# Patient Record
Sex: MALE | Race: WHITE | NOT HISPANIC OR LATINO | Employment: FULL TIME | URBAN - METROPOLITAN AREA
[De-identification: names, ages, dates, MRNs, and addresses within clinical notes are randomized per-mention and may not be internally consistent; named-entity substitution may affect disease eponyms.]

---

## 2019-07-22 ENCOUNTER — TELEPHONE (OUTPATIENT)
Dept: SCHEDULING | Facility: IMAGING CENTER | Age: 39
End: 2019-07-22

## 2019-09-23 ENCOUNTER — OFFICE VISIT (OUTPATIENT)
Dept: MEDICAL GROUP | Facility: MEDICAL CENTER | Age: 39
End: 2019-09-23
Payer: COMMERCIAL

## 2019-09-23 VITALS
WEIGHT: 288.8 LBS | HEIGHT: 75 IN | OXYGEN SATURATION: 95 % | TEMPERATURE: 98.3 F | BODY MASS INDEX: 35.91 KG/M2 | SYSTOLIC BLOOD PRESSURE: 138 MMHG | HEART RATE: 68 BPM | RESPIRATION RATE: 16 BRPM | DIASTOLIC BLOOD PRESSURE: 94 MMHG

## 2019-09-23 DIAGNOSIS — I10 ESSENTIAL HYPERTENSION: ICD-10-CM

## 2019-09-23 DIAGNOSIS — Z76.89 ENCOUNTER TO ESTABLISH CARE: ICD-10-CM

## 2019-09-23 DIAGNOSIS — Z00.00 PREVENTATIVE HEALTH CARE: ICD-10-CM

## 2019-09-23 DIAGNOSIS — E66.9 OBESITY (BMI 35.0-39.9 WITHOUT COMORBIDITY): ICD-10-CM

## 2019-09-23 PROCEDURE — 99204 OFFICE O/P NEW MOD 45 MIN: CPT | Performed by: PHYSICIAN ASSISTANT

## 2019-09-23 RX ORDER — AMLODIPINE BESYLATE 5 MG/1
5 TABLET ORAL DAILY
Qty: 30 TAB | Refills: 3 | Status: SHIPPED | OUTPATIENT
Start: 2019-09-23 | End: 2019-12-23 | Stop reason: SDUPTHER

## 2019-09-23 ASSESSMENT — PATIENT HEALTH QUESTIONNAIRE - PHQ9: CLINICAL INTERPRETATION OF PHQ2 SCORE: 0

## 2019-09-23 NOTE — PROGRESS NOTES
"Subjective:   João Luque is a 39 y.o. male here today for establishing care and hypertension.    Essential hypertension  This is a 39-year-old male who is here today to establish care.  Believes he has a chronic history of elevated blood pressure.  Over the summer it appears his blood pressure was getting worse.  Checks his blood pressure routinely.  Typically 140/90.  This is in the morning.  His mother has hypertension.  He denies any chest pain or shortness of breath.  He is .  3 children.  Had a failed vasectomy because neurology was unable to find his vas deferens.  They are currently using condoms.  He has no other chronic medical conditions.  Does take vitamin D daily.  5000 IUs.  Supplement he believes is very helpful.       Current medicines (including changes today)  Current Outpatient Medications   Medication Sig Dispense Refill   • amLODIPine (NORVASC) 5 MG Tab Take 1 Tab by mouth every day. 30 Tab 3   • Cholecalciferol (VITAMIN D3) 1000 units Cap Take 5,000 Units by mouth every day.       No current facility-administered medications for this visit.      He  has a past medical history of Obesity (1/29/2013).    Social History and Family History were reviewed and updated.    ROS   No chest pain, no shortness of breath, no abdominal pain and all other systems were reviewed and are negative.       Objective:     /94 (BP Location: Left arm, Patient Position: Sitting, BP Cuff Size: Adult)   Pulse 68   Temp 36.8 °C (98.3 °F) (Temporal)   Resp 16   Ht 1.905 m (6' 3\")   Wt (!) 131 kg (288 lb 12.8 oz)   SpO2 95%  Body mass index is 36.1 kg/m².   Physical Exam:  Constitutional: Alert, no distress.  Skin: Warm, dry, good turgor, no rashes in visible areas.  Eye: Equal, round and reactive, conjunctiva clear, lids normal.  ENMT: Lips without lesions, good dentition, oropharynx clear.  Neck: Trachea midline, no masses.   Lymph: No cervical or supraclavicular lymphadenopathy  Respiratory: " Unlabored respiratory effort, lungs clear to auscultation, no wheezes, no ronchi.  Cardiovascular: Normal S1, S2, no murmur, no edema.  Psych: Alert and oriented x3, normal affect and mood.        Assessment and Plan:   The following treatment plan was discussed    1. Essential hypertension  Chronic condition.  Discussed making dietary and lifestyle changes.  Will start on amlodipine 5 mg.  Discussed side effects.  Obtain labs in 2 to 3 weeks.  Follow-up in 3 months.  - amLODIPine (NORVASC) 5 MG Tab; Take 1 Tab by mouth every day.  Dispense: 30 Tab; Refill: 3    2. Obesity (BMI 35.0-39.9 without comorbidity)  Chronic condition.  We will continue to monitor.  - Patient identified as having weight management issue.  Appropriate orders and counseling given.    3. Preventative health care  Ordered labs fasting.  He will be contacted with the results.  - Lipid Profile; Future  - Comp Metabolic Panel; Future  - HEMOGLOBIN A1C; Future  - VITAMIN D,25 HYDROXY; Future    4. Encounter to establish care      Followup: Return in about 3 months (around 12/23/2019), or if symptoms worsen or fail to improve.    Please note that this dictation was created using voice recognition software. I have made every reasonable attempt to correct obvious errors, but I expect that there are errors of grammar and possibly content that I did not discover before finalizing the note.

## 2019-12-13 ENCOUNTER — HOSPITAL ENCOUNTER (OUTPATIENT)
Dept: LAB | Facility: MEDICAL CENTER | Age: 39
End: 2019-12-13
Attending: PHYSICIAN ASSISTANT
Payer: COMMERCIAL

## 2019-12-13 DIAGNOSIS — Z00.00 PREVENTATIVE HEALTH CARE: ICD-10-CM

## 2019-12-13 LAB
ALBUMIN SERPL BCP-MCNC: 4.9 G/DL (ref 3.2–4.9)
ALBUMIN/GLOB SERPL: 1.6 G/DL
ALP SERPL-CCNC: 47 U/L (ref 30–99)
ALT SERPL-CCNC: 37 U/L (ref 2–50)
ANION GAP SERPL CALC-SCNC: 11 MMOL/L (ref 0–11.9)
AST SERPL-CCNC: 28 U/L (ref 12–45)
BILIRUB SERPL-MCNC: 0.5 MG/DL (ref 0.1–1.5)
BUN SERPL-MCNC: 17 MG/DL (ref 8–22)
CALCIUM SERPL-MCNC: 10 MG/DL (ref 8.5–10.5)
CHLORIDE SERPL-SCNC: 105 MMOL/L (ref 96–112)
CHOLEST SERPL-MCNC: 223 MG/DL (ref 100–199)
CO2 SERPL-SCNC: 24 MMOL/L (ref 20–33)
CREAT SERPL-MCNC: 1.22 MG/DL (ref 0.5–1.4)
FASTING STATUS PATIENT QL REPORTED: NORMAL
GLOBULIN SER CALC-MCNC: 3 G/DL (ref 1.9–3.5)
GLUCOSE SERPL-MCNC: 87 MG/DL (ref 65–99)
HDLC SERPL-MCNC: 37 MG/DL
LDLC SERPL CALC-MCNC: 154 MG/DL
POTASSIUM SERPL-SCNC: 4 MMOL/L (ref 3.6–5.5)
PROT SERPL-MCNC: 7.9 G/DL (ref 6–8.2)
SODIUM SERPL-SCNC: 140 MMOL/L (ref 135–145)
TRIGL SERPL-MCNC: 162 MG/DL (ref 0–149)

## 2019-12-13 PROCEDURE — 36415 COLL VENOUS BLD VENIPUNCTURE: CPT

## 2019-12-13 PROCEDURE — 80053 COMPREHEN METABOLIC PANEL: CPT

## 2019-12-13 PROCEDURE — 83036 HEMOGLOBIN GLYCOSYLATED A1C: CPT

## 2019-12-13 PROCEDURE — 80061 LIPID PANEL: CPT

## 2019-12-13 PROCEDURE — 82306 VITAMIN D 25 HYDROXY: CPT

## 2019-12-14 LAB — 25(OH)D3 SERPL-MCNC: 55 NG/ML (ref 30–100)

## 2019-12-17 PROBLEM — E78.2 MIXED HYPERLIPIDEMIA: Status: ACTIVE | Noted: 2019-12-17

## 2019-12-17 LAB
EST. AVERAGE GLUCOSE BLD GHB EST-MCNC: 108 MG/DL
HBA1C MFR BLD: 5.4 % (ref 0–5.6)

## 2019-12-23 ENCOUNTER — OFFICE VISIT (OUTPATIENT)
Dept: MEDICAL GROUP | Facility: MEDICAL CENTER | Age: 39
End: 2019-12-23
Payer: COMMERCIAL

## 2019-12-23 VITALS
OXYGEN SATURATION: 95 % | BODY MASS INDEX: 35.63 KG/M2 | SYSTOLIC BLOOD PRESSURE: 134 MMHG | RESPIRATION RATE: 16 BRPM | HEART RATE: 82 BPM | HEIGHT: 75 IN | DIASTOLIC BLOOD PRESSURE: 88 MMHG | WEIGHT: 286.6 LBS | TEMPERATURE: 98.2 F

## 2019-12-23 DIAGNOSIS — E78.2 MIXED HYPERLIPIDEMIA: ICD-10-CM

## 2019-12-23 DIAGNOSIS — I10 ESSENTIAL HYPERTENSION: ICD-10-CM

## 2019-12-23 DIAGNOSIS — G47.33 OBSTRUCTIVE SLEEP APNEA SYNDROME: ICD-10-CM

## 2019-12-23 PROCEDURE — 99214 OFFICE O/P EST MOD 30 MIN: CPT | Performed by: PHYSICIAN ASSISTANT

## 2019-12-23 RX ORDER — AMLODIPINE BESYLATE 5 MG/1
5 TABLET ORAL DAILY
Qty: 90 TAB | Refills: 1 | Status: SHIPPED | OUTPATIENT
Start: 2019-12-23 | End: 2020-07-06

## 2019-12-23 NOTE — PROGRESS NOTES
"Subjective:   João Luque is a 39 y.o. male here today for hyperlipidemia, hypertension, sleep apnea and lab results review.    Mixed hyperlipidemia  This is a 39-year-old male who is here today to discuss recent labs.  Labs overall were good except his cholesterol profile.  Showed the following:     Ref. Range 12/13/2019 07:40   Cholesterol,Tot Latest Ref Range: 100 - 199 mg/dL 223 (H)   Triglycerides Latest Ref Range: 0 - 149 mg/dL 162 (H)   HDL Latest Ref Range: >=40 mg/dL 37 (A)   LDL Latest Ref Range: <100 mg/dL 154 (H)     His last cholesterol drawn in 2013 or so his total cholesterol was less than 200.  .  He states that he would like to be given the opportunity to eat healthier and exercise routinely.  He knows that he can make some changes.  In the past he is done well with exercising.    Essential hypertension  Chronic condition.  Currently on 5 mg of amlodipine.  Denies any side effects taking the medication.    Obstructive sleep apnea syndrome  States in 2013 he had a sleep study that showed sleep apnea.  Had witnessed apneic events.  Currently using CPAP.  Wears it nightly.  No concerns.       Current medicines (including changes today)  Current Outpatient Medications   Medication Sig Dispense Refill   • amLODIPine (NORVASC) 5 MG Tab Take 1 Tab by mouth every day. 90 Tab 1   • Cholecalciferol (VITAMIN D3) 1000 units Cap Take 5,000 Units by mouth every day.       No current facility-administered medications for this visit.      He  has a past medical history of Obesity (1/29/2013).    Social History and Family History were reviewed and updated.    ROS   No chest pain, no shortness of breath, no abdominal pain and all other systems were reviewed and are negative.       Objective:     /88 (BP Location: Left arm, Patient Position: Sitting, BP Cuff Size: Adult)   Pulse 82   Temp 36.8 °C (98.2 °F) (Temporal)   Resp 16   Ht 1.905 m (6' 3\")   Wt (!) 130 kg (286 lb 9.6 oz)   SpO2 95%  " Body mass index is 35.82 kg/m².   Physical Exam:  Constitutional: Alert, no distress.  Skin: Warm, dry, good turgor, no rashes in visible areas.  Eye: Equal, round and reactive, conjunctiva clear, lids normal.  ENMT: Lips without lesions, good dentition, oropharynx clear.  Neck: Trachea midline, no masses.   Lymph: No cervical or supraclavicular lymphadenopathy  Respiratory: Unlabored respiratory effort, lungs appear clear, no wheezes.  Cardiovascular: Regular rate and rhythm.  Psych: Alert and oriented x3, normal affect and mood.        Assessment and Plan:   The following treatment plan was discussed    1. Mixed hyperlipidemia  New condition noted in chart.  Discussed concern regarding hypertension and now hyperlipidemia.  States that he will begin an exercise program as well as eat healthier.  Discussed fatty food intake concern.  Will check cholesterol profile in 1 to 3 months.  If not improved he will be started on a statin.  - Lipid Profile; Future    2. Obstructive sleep apnea syndrome  New condition noted in chart but chronic.  Continue CPAP use.    3. Essential hypertension  Chronic condition.  Stable.  Given his potential of changing with lifestyle changes will hold off on increasing amlodipine and continue at 5 mg daily.  Renewed medication.  - amLODIPine (NORVASC) 5 MG Tab; Take 1 Tab by mouth every day.  Dispense: 90 Tab; Refill: 1      Followup: Return in about 6 months (around 6/23/2020), or if symptoms worsen or fail to improve.    Please note that this dictation was created using voice recognition software. I have made every reasonable attempt to correct obvious errors, but I expect that there are errors of grammar and possibly content that I did not discover before finalizing the note.

## 2019-12-23 NOTE — ASSESSMENT & PLAN NOTE
States in 2013 he had a sleep study that showed sleep apnea.  Had witnessed apneic events.  Currently using CPAP.  Wears it nightly.  No concerns.

## 2019-12-23 NOTE — ASSESSMENT & PLAN NOTE
This is a 39-year-old male who is here today to discuss recent labs.  Labs overall were good except his cholesterol profile.  Showed the following:     Ref. Range 12/13/2019 07:40   Cholesterol,Tot Latest Ref Range: 100 - 199 mg/dL 223 (H)   Triglycerides Latest Ref Range: 0 - 149 mg/dL 162 (H)   HDL Latest Ref Range: >=40 mg/dL 37 (A)   LDL Latest Ref Range: <100 mg/dL 154 (H)     His last cholesterol drawn in 2013 or so his total cholesterol was less than 200.  .  He states that he would like to be given the opportunity to eat healthier and exercise routinely.  He knows that he can make some changes.  In the past he is done well with exercising.

## 2019-12-23 NOTE — ASSESSMENT & PLAN NOTE
Chronic condition.  Currently on 5 mg of amlodipine.  Denies any side effects taking the medication.

## 2020-06-16 ENCOUNTER — APPOINTMENT (OUTPATIENT)
Dept: MEDICAL GROUP | Facility: MEDICAL CENTER | Age: 40
End: 2020-06-16
Payer: COMMERCIAL

## 2020-06-24 ENCOUNTER — HOSPITAL ENCOUNTER (OUTPATIENT)
Dept: LAB | Facility: MEDICAL CENTER | Age: 40
End: 2020-06-24
Attending: PHYSICIAN ASSISTANT
Payer: COMMERCIAL

## 2020-06-24 DIAGNOSIS — E78.2 MIXED HYPERLIPIDEMIA: ICD-10-CM

## 2020-06-24 LAB
CHOLEST SERPL-MCNC: 221 MG/DL (ref 100–199)
FASTING STATUS PATIENT QL REPORTED: NORMAL
HDLC SERPL-MCNC: 41 MG/DL
LDLC SERPL CALC-MCNC: 146 MG/DL
TRIGL SERPL-MCNC: 170 MG/DL (ref 0–149)

## 2020-06-24 PROCEDURE — 80061 LIPID PANEL: CPT

## 2020-06-24 PROCEDURE — 36415 COLL VENOUS BLD VENIPUNCTURE: CPT

## 2020-06-26 ENCOUNTER — APPOINTMENT (OUTPATIENT)
Dept: MEDICAL GROUP | Facility: MEDICAL CENTER | Age: 40
End: 2020-06-26
Payer: COMMERCIAL

## 2020-06-30 ENCOUNTER — OFFICE VISIT (OUTPATIENT)
Dept: MEDICAL GROUP | Facility: MEDICAL CENTER | Age: 40
End: 2020-06-30
Payer: COMMERCIAL

## 2020-06-30 VITALS
RESPIRATION RATE: 16 BRPM | BODY MASS INDEX: 33.17 KG/M2 | HEIGHT: 75 IN | DIASTOLIC BLOOD PRESSURE: 86 MMHG | TEMPERATURE: 98.4 F | WEIGHT: 266.76 LBS | HEART RATE: 68 BPM | SYSTOLIC BLOOD PRESSURE: 132 MMHG | OXYGEN SATURATION: 94 %

## 2020-06-30 DIAGNOSIS — I10 ESSENTIAL HYPERTENSION: ICD-10-CM

## 2020-06-30 DIAGNOSIS — E78.2 MIXED HYPERLIPIDEMIA: ICD-10-CM

## 2020-06-30 PROCEDURE — 99214 OFFICE O/P EST MOD 30 MIN: CPT | Performed by: PHYSICIAN ASSISTANT

## 2020-06-30 RX ORDER — ATORVASTATIN CALCIUM 20 MG/1
20 TABLET, FILM COATED ORAL DAILY
Qty: 30 TAB | Refills: 2 | Status: SHIPPED | OUTPATIENT
Start: 2020-06-30 | End: 2020-10-12

## 2020-06-30 ASSESSMENT — PATIENT HEALTH QUESTIONNAIRE - PHQ9: CLINICAL INTERPRETATION OF PHQ2 SCORE: 0

## 2020-06-30 NOTE — PROGRESS NOTES
"Subjective:   João Luque is a 40 y.o. male here today for hyperlipidemia and hypertension.    Mixed hyperlipidemia  This is a 40-year-old male who following up today regarding his cholesterol profile.  LDL dropped 10 points.  Still in the 140s.  He changed his meat from red to white.  Disappointed in the small change.  Family history of heart disease with his dad.  Would like to start medication.    Essential hypertension  Chronic condition.  Amlodipine 5 mg has been effective.  Blood pressure currently runs in the 120s over 70s at home.  No side effects.       Current medicines (including changes today)  Current Outpatient Medications   Medication Sig Dispense Refill   • atorvastatin (LIPITOR) 20 MG Tab Take 1 Tab by mouth every day. 30 Tab 2   • amLODIPine (NORVASC) 5 MG Tab Take 1 Tab by mouth every day. 90 Tab 1   • Cholecalciferol (VITAMIN D3) 1000 units Cap Take 5,000 Units by mouth every day.       No current facility-administered medications for this visit.      He  has a past medical history of Obesity (1/29/2013).    Social History and Family History were reviewed and updated.    ROS   No chest pain, no shortness of breath, no abdominal pain and all other systems were reviewed and are negative.       Objective:     /86   Pulse 68   Temp 36.9 °C (98.4 °F) (Temporal)   Resp 16   Ht 1.905 m (6' 3\")   Wt 121 kg (266 lb 12.1 oz)   SpO2 94%  Body mass index is 33.34 kg/m².   Physical Exam:  Constitutional: Alert, no distress.  Skin: Warm, dry, good turgor, no rashes in visible areas.  Eye: Equal, round and reactive, conjunctiva clear, lids normal.  ENMT: Lips without lesions, good dentition, oropharynx clear.  Neck: Trachea midline, no masses.   Lymph: No cervical or supraclavicular lymphadenopathy  Respiratory: Unlabored respiratory effort, lungs appear clear, no wheezes.  Cardiovascular: Regular rate and rhythm.  Psych: Alert and oriented x3, normal affect and mood.        Assessment and " Plan:   The following treatment plan was discussed    1. Mixed hyperlipidemia  Chronic condition.  Uncontrolled.  Will start Lipitor.  Discussed side effects.  Repeat cholesterol profile and hepatic function in 1 month.  - atorvastatin (LIPITOR) 20 MG Tab; Take 1 Tab by mouth every day.  Dispense: 30 Tab; Refill: 2  - HEPATIC FUNCTION PANEL; Future  - Lipid Profile; Future    2. Essential hypertension  Chronic condition.  Stable.  Continue amlodipine as directed.  Continue to develop lifestyle modifications.  Exercise routinely if possible.      Followup: Return in about 6 months (around 12/30/2020), or if symptoms worsen or fail to improve.    Please note that this dictation was created using voice recognition software. I have made every reasonable attempt to correct obvious errors, but I expect that there are errors of grammar and possibly content that I did not discover before finalizing the note.

## 2020-06-30 NOTE — ASSESSMENT & PLAN NOTE
Chronic condition.  Amlodipine 5 mg has been effective.  Blood pressure currently runs in the 120s over 70s at home.  No side effects.

## 2020-06-30 NOTE — ASSESSMENT & PLAN NOTE
This is a 40-year-old male who following up today regarding his cholesterol profile.  LDL dropped 10 points.  Still in the 140s.  He changed his meat from red to white.  Disappointed in the small change.  Family history of heart disease with his dad.  Would like to start medication.

## 2020-11-20 ENCOUNTER — HOSPITAL ENCOUNTER (OUTPATIENT)
Dept: LAB | Facility: MEDICAL CENTER | Age: 40
End: 2020-11-20
Payer: COMMERCIAL

## 2020-11-22 LAB
COVID ORDER STATUS COVID19: NORMAL
SARS-COV-2 RNA RESP QL NAA+PROBE: DETECTED
SPECIMEN SOURCE: ABNORMAL

## 2020-12-11 ENCOUNTER — HOSPITAL ENCOUNTER (OUTPATIENT)
Dept: LAB | Facility: MEDICAL CENTER | Age: 40
End: 2020-12-11
Attending: PHYSICIAN ASSISTANT
Payer: COMMERCIAL

## 2020-12-11 DIAGNOSIS — E78.2 MIXED HYPERLIPIDEMIA: ICD-10-CM

## 2020-12-11 LAB
ALBUMIN SERPL BCP-MCNC: 4.6 G/DL (ref 3.2–4.9)
ALP SERPL-CCNC: 55 U/L (ref 30–99)
ALT SERPL-CCNC: 35 U/L (ref 2–50)
AST SERPL-CCNC: 23 U/L (ref 12–45)
BILIRUB CONJ SERPL-MCNC: <0.2 MG/DL (ref 0.1–0.5)
BILIRUB INDIRECT SERPL-MCNC: NORMAL MG/DL (ref 0–1)
BILIRUB SERPL-MCNC: 0.6 MG/DL (ref 0.1–1.5)
CHOLEST SERPL-MCNC: 161 MG/DL (ref 100–199)
FASTING STATUS PATIENT QL REPORTED: NORMAL
HDLC SERPL-MCNC: 38 MG/DL
LDLC SERPL CALC-MCNC: 96 MG/DL
PROT SERPL-MCNC: 7.3 G/DL (ref 6–8.2)
TRIGL SERPL-MCNC: 135 MG/DL (ref 0–149)

## 2020-12-11 PROCEDURE — 36415 COLL VENOUS BLD VENIPUNCTURE: CPT

## 2020-12-11 PROCEDURE — 80061 LIPID PANEL: CPT

## 2020-12-11 PROCEDURE — 80076 HEPATIC FUNCTION PANEL: CPT

## 2020-12-15 ENCOUNTER — OFFICE VISIT (OUTPATIENT)
Dept: MEDICAL GROUP | Facility: MEDICAL CENTER | Age: 40
End: 2020-12-15
Payer: COMMERCIAL

## 2020-12-15 VITALS
TEMPERATURE: 98 F | DIASTOLIC BLOOD PRESSURE: 78 MMHG | HEART RATE: 83 BPM | OXYGEN SATURATION: 98 % | HEIGHT: 75 IN | BODY MASS INDEX: 36.18 KG/M2 | WEIGHT: 291.01 LBS | SYSTOLIC BLOOD PRESSURE: 128 MMHG

## 2020-12-15 DIAGNOSIS — Z86.16 HISTORY OF 2019 NOVEL CORONAVIRUS DISEASE (COVID-19): ICD-10-CM

## 2020-12-15 DIAGNOSIS — E78.2 MIXED HYPERLIPIDEMIA: ICD-10-CM

## 2020-12-15 DIAGNOSIS — E66.9 OBESITY (BMI 35.0-39.9 WITHOUT COMORBIDITY): ICD-10-CM

## 2020-12-15 DIAGNOSIS — I10 ESSENTIAL HYPERTENSION: ICD-10-CM

## 2020-12-15 DIAGNOSIS — F41.9 ANXIETY: ICD-10-CM

## 2020-12-15 PROCEDURE — 99214 OFFICE O/P EST MOD 30 MIN: CPT | Performed by: PHYSICIAN ASSISTANT

## 2020-12-15 NOTE — PROGRESS NOTES
Subjective:   João Luque is a 40 y.o. male here today for history of Covid, hypertension, hyperlipidemia and anxiety.    History of 2019 novel coronavirus disease (COVID-19)  This is a 40-year-old male here today to follow-up on his health.  Last month contracted Covid.  Wife got it first.  All 3 other children had it.  Thought possibly they contracted it during a Girl  event.  Ivelisse is doing well.  He had some nasal congestion and lost his sense of smell.  Symptoms have resolved.  His loss of smell and taste are returning slowly.    Essential hypertension  Chronic condition.  States his blood pressure has been improved.  In the morning it is reading in the 120s over 70s.  He has gained significant weight since his last appointment and June.  Plans to lose weight again.    Mixed hyperlipidemia  Cholesterol levels did improve on Lipitor 20 mg.  LDL below 100.    Anxiety  Complains of a chronic history of anxiety.  States that he and his wife went to marriage counseling for about a year.  It helped a lot with her marriage and his anxiety stems mostly from work and just as an individual.  He consumes about 3 beers a week.  Denies any known triggers to anxiety.  Denies any depression.  Does have stressors at work.  Would like to see a therapist for counseling.       Current medicines (including changes today)  Current Outpatient Medications   Medication Sig Dispense Refill   • atorvastatin (LIPITOR) 20 MG Tab TAKE ONE TABLET BY MOUTH EVERY DAY 90 Tab 1   • amLODIPine (NORVASC) 5 MG Tab TAKE ONE TABLET BY MOUTH EVERY DAY 90 Tab 1   • Cholecalciferol (VITAMIN D3) 1000 units Cap Take 5,000 Units by mouth every day.       No current facility-administered medications for this visit.      He  has a past medical history of Obesity (1/29/2013).    Social History and Family History were reviewed and updated.    ROS   No chest pain, no shortness of breath, no abdominal pain and all other systems were reviewed and are  "negative.       Objective:     /78 (BP Location: Left arm, Patient Position: Sitting)   Pulse 83   Temp 36.7 °C (98 °F) (Temporal)   Ht 1.905 m (6' 3\")   Wt (!) 132 kg (291 lb 0.1 oz)   SpO2 98%  Body mass index is 36.37 kg/m².   Physical Exam:  Constitutional: Alert, no distress.  Skin: Warm, dry, good turgor, no rashes in visible areas.  Eye: Equal, round and reactive, conjunctiva clear, lids normal.  ENMT: Lips without lesions, good dentition, oropharynx clear.  Neck: Trachea midline, no masses.   Lymph: No cervical or supraclavicular lymphadenopathy  Respiratory: Unlabored respiratory effort, lungs appear clear, no wheezes.  Cardiovascular: Regular rate rhythm.  Psych: Alert and oriented x3, normal affect and mood.        Assessment and Plan:   The following treatment plan was discussed    1. History of 2019 novel coronavirus disease (COVID-19)  Acute, new onset condition.  Resolved.  Stable.    2. Essential hypertension  Chronic condition.  Stable.  We will see how he does in the next 6 months.  Needs to lose weight again.  We will continue on the amlodipine 5 mg.    3. Mixed hyperlipidemia  Chronic condition.  Controlled.  Continue medication as directed.    4. Anxiety  New condition noted in chart but chronic.  Refer to psychology for evaluation and treatment.  - REFERRAL TO BEHAVIORAL HEALTH    5. Obesity (BMI 35.0-39.9 without comorbidity) (HCC)  Chronic condition.  We will continue to monitor.  - Patient identified as having weight management issue.  Appropriate orders and counseling given.      Followup: Return in about 6 months (around 6/15/2021), or if symptoms worsen or fail to improve.    Please note that this dictation was created using voice recognition software. I have made every reasonable attempt to correct obvious errors, but I expect that there are errors of grammar and possibly content that I did not discover before finalizing the note.           "

## 2020-12-15 NOTE — ASSESSMENT & PLAN NOTE
This is a 40-year-old male here today to follow-up on his health.  Last month contracted Covid.  Wife got it first.  All 3 other children had it.  Thought possibly they contracted it during a Girl  event.  Ivelisse is doing well.  He had some nasal congestion and lost his sense of smell.  Symptoms have resolved.  His loss of smell and taste are returning slowly.

## 2020-12-15 NOTE — ASSESSMENT & PLAN NOTE
Chronic condition.  States his blood pressure has been improved.  In the morning it is reading in the 120s over 70s.  He has gained significant weight since his last appointment and June.  Plans to lose weight again.

## 2020-12-15 NOTE — ASSESSMENT & PLAN NOTE
Complains of a chronic history of anxiety.  States that he and his wife went to marriage counseling for about a year.  It helped a lot with her marriage and his anxiety stems mostly from work and just as an individual.  He consumes about 3 beers a week.  Denies any known triggers to anxiety.  Denies any depression.  Does have stressors at work.  Would like to see a therapist for counseling.

## 2021-01-16 DIAGNOSIS — I10 ESSENTIAL HYPERTENSION: ICD-10-CM

## 2021-01-17 RX ORDER — AMLODIPINE BESYLATE 5 MG/1
TABLET ORAL
Qty: 90 TAB | Refills: 1 | Status: SHIPPED | OUTPATIENT
Start: 2021-01-17 | End: 2021-07-16

## 2021-03-31 ENCOUNTER — OFFICE VISIT (OUTPATIENT)
Dept: DERMATOLOGY | Facility: IMAGING CENTER | Age: 41
End: 2021-03-31
Payer: COMMERCIAL

## 2021-03-31 DIAGNOSIS — D22.9 MULTIPLE NEVI: ICD-10-CM

## 2021-03-31 PROCEDURE — 99202 OFFICE O/P NEW SF 15 MIN: CPT | Performed by: NURSE PRACTITIONER

## 2021-03-31 NOTE — PROGRESS NOTES
DERMATOLOGY NOTE  NEW VISIT       Chief complaint: mole of concern    HPI:mole that had some black spots in it, but now the black spots are gone  Location: upper back right shoulder   Time present:  2 weeks   Painful lesion: No  Itching lesion: No  Enlarging lesion: No  Anything make it better or worse?no     HPI: mole at scalp has not changed just would like it looked at   Location:  Back of neck   Time present: birth   Painful lesion: No  Itching lesion: No  Enlarging lesion: No  Anything make it better or worse?          History of skin cancer: No  History of precancers/actinic keratoses: No  History of biopsies:No  History of blistering/severe sunburns:No  Family history of skin cancer:No  Family history of atypical moles:No          No Known Allergies     MEDICATIONS:  Medications relevant to specialty reviewed.       REVIEW OF SYSTEMS:   Positive for skin (see HPI)  Negative for fevers and chills       EXAM:  There were no vitals taken for this visit.  Constitutional: Well-developed, well-nourished, and in no distress.     A focused skin exam was performed including the affected areas of the back and scalp. Notable findings on exam today listed below and/or in assessment/plan.     Multiple tan light brown skin-colored macules papules scattered over the back and neck. No concerning features under dermoscopy    Benign appearing nevus to right side of scalp.       IMPRESSION / PLAN:    1. Multiple nevi  - Benign-appearing nature of lesions discussed. Advised to return to clinic for any new or concerning changes.  - ABCDE's of melanoma handout  -sun protection and handout provided         Return to clinic in: Return for for any skin concern. and as needed for any new or changing skin lesions.

## 2021-05-05 DIAGNOSIS — E78.2 MIXED HYPERLIPIDEMIA: ICD-10-CM

## 2021-05-07 RX ORDER — ATORVASTATIN CALCIUM 20 MG/1
TABLET, FILM COATED ORAL
Qty: 90 TABLET | Refills: 1 | Status: SHIPPED | OUTPATIENT
Start: 2021-05-07 | End: 2021-11-30

## 2021-07-16 DIAGNOSIS — I10 ESSENTIAL HYPERTENSION: ICD-10-CM

## 2021-07-16 RX ORDER — AMLODIPINE BESYLATE 5 MG/1
TABLET ORAL
Qty: 90 TABLET | Refills: 1 | Status: SHIPPED
Start: 2021-07-16 | End: 2022-01-21

## 2021-07-23 NOTE — ASSESSMENT & PLAN NOTE
This is a 39-year-old male who is here today to establish care.  Believes he has a chronic history of elevated blood pressure.  Over the summer it appears his blood pressure was getting worse.  Checks his blood pressure routinely.  Typically 140/90.  This is in the morning.  His mother has hypertension.  He denies any chest pain or shortness of breath.  He is .  3 children.  Had a failed vasectomy because neurology was unable to find his vas deferens.  They are currently using condoms.  He has no other chronic medical conditions.  Does take vitamin D daily.  5000 IUs.  Supplement he believes is very helpful.  
23-Jul-2021

## 2022-01-21 ENCOUNTER — OFFICE VISIT (OUTPATIENT)
Dept: MEDICAL GROUP | Facility: MEDICAL CENTER | Age: 42
End: 2022-01-21
Payer: COMMERCIAL

## 2022-01-21 VITALS
HEIGHT: 75 IN | DIASTOLIC BLOOD PRESSURE: 102 MMHG | HEART RATE: 60 BPM | RESPIRATION RATE: 20 BRPM | TEMPERATURE: 98.1 F | BODY MASS INDEX: 36.76 KG/M2 | WEIGHT: 295.64 LBS | OXYGEN SATURATION: 96 % | SYSTOLIC BLOOD PRESSURE: 140 MMHG

## 2022-01-21 DIAGNOSIS — F41.9 ANXIETY: ICD-10-CM

## 2022-01-21 DIAGNOSIS — Z00.00 PREVENTATIVE HEALTH CARE: ICD-10-CM

## 2022-01-21 DIAGNOSIS — I10 ESSENTIAL HYPERTENSION: ICD-10-CM

## 2022-01-21 PROCEDURE — 99214 OFFICE O/P EST MOD 30 MIN: CPT | Performed by: PHYSICIAN ASSISTANT

## 2022-01-21 RX ORDER — FLUOXETINE 10 MG/1
CAPSULE ORAL
COMMUNITY
Start: 2022-01-19 | End: 2022-07-15

## 2022-01-21 RX ORDER — AMLODIPINE BESYLATE 10 MG/1
10 TABLET ORAL DAILY
Qty: 90 TABLET | Refills: 1 | Status: SHIPPED | OUTPATIENT
Start: 2022-01-21 | End: 2022-07-15 | Stop reason: SDUPTHER

## 2022-01-21 NOTE — ASSESSMENT & PLAN NOTE
Currently follows at [facility] psychiatry.  Is currently on a low-dose Prozac at 10 mg.  He is also going through therapy.  Has had some clenched jaw sensation over the past couple days.  Started fluoxetine about 3 days ago.

## 2022-01-21 NOTE — ASSESSMENT & PLAN NOTE
This is a pleasant 42-year-old male here today to follow-up on his blood pressure.  Blood pressure still remains elevated.  He is currently not taking 5 mg of amlodipine.  Was taking it up until a few weeks ago.  Blood pressure typically 140/90 or around that area.

## 2022-01-21 NOTE — PROGRESS NOTES
"Subjective:   João Luque is a 42 y.o. male here today for hypertension and anxiety.    Essential hypertension  This is a pleasant 42-year-old male here today to follow-up on his blood pressure.  Blood pressure still remains elevated.  He is currently not taking 5 mg of amlodipine.  Was taking it up until a few weeks ago.  Blood pressure typically 140/90 or around that area.    Anxiety  Currently follows at [facility] psychiatry.  Is currently on a low-dose Prozac at 10 mg.  He is also going through therapy.  Has had some clenched jaw sensation over the past couple days.  Started fluoxetine about 3 days ago.       Current medicines (including changes today)  Current Outpatient Medications   Medication Sig Dispense Refill   • amLODIPine (NORVASC) 10 MG Tab Take 1 Tablet by mouth every day. 90 Tablet 1   • FLUoxetine (PROZAC) 10 MG Cap      • atorvastatin (LIPITOR) 20 MG Tab TAKE ONE TABLET BY MOUTH EVERY DAY 60 Tablet 0   • Cholecalciferol (VITAMIN D3) 1000 units Cap Take 5,000 Units by mouth every day.       No current facility-administered medications for this visit.     He  has a past medical history of Obesity (1/29/2013).    Social History and Family History were reviewed and updated.    ROS   No chest pain, no shortness of breath, no abdominal pain and all other systems were reviewed and are negative.       Objective:     /102 (BP Location: Left arm, Patient Position: Sitting, BP Cuff Size: Adult)   Pulse 60   Temp 36.7 °C (98.1 °F) (Temporal)   Resp 20   Ht 1.905 m (6' 3\")   Wt (!) 134 kg (295 lb 10.2 oz)   SpO2 96%  Body mass index is 36.95 kg/m².   Physical Exam:  Constitutional: Alert, no distress.  Skin: Warm, dry, good turgor, no rashes in visible areas.  Eye: Equal, round and reactive, conjunctiva clear, lids normal.  ENMT: Lips without lesions, good dentition, oropharynx clear.  Neck: Trachea midline, no masses.   Lymph: No cervical or supraclavicular lymphadenopathy  Respiratory: " Unlabored respiratory effort, lungs appear clear, no wheezes.  Cardiovascular: Regular rate and rhythm.  Psych: Alert and oriented x3, normal affect and mood.        Assessment and Plan:   The following treatment plan was discussed    1. Essential hypertension  Chronic condition.  Uncontrolled.  Will increase amlodipine to 10 mg daily.  He will check his blood pressure and notify me if blood pressure not improving.  - amLODIPine (NORVASC) 10 MG Tab; Take 1 Tablet by mouth every day.  Dispense: 90 Tablet; Refill: 1    2. Anxiety  Chronic condition.  Stable.  Continue to follow with psychiatry.  Continue 10 mg daily of fluoxetine.  Advised that the clench jaw may be secondary to starting the new medication.    3. Preventative health care  Ordered labs fasting.  He will be contacted with the results.  Fast 8 hours.  - Lipid Profile; Future  - Comp Metabolic Panel; Future  - HEMOGLOBIN A1C; Future  - CBC WITH DIFFERENTIAL; Future  - TSH WITH REFLEX TO FT4; Future         Followup: Return in about 6 months (around 7/21/2022), or if symptoms worsen or fail to improve.    Please note that this dictation was created using voice recognition software. I have made every reasonable attempt to correct obvious errors, but I expect that there are errors of grammar and possibly content that I did not discover before finalizing the note.

## 2022-07-08 ENCOUNTER — APPOINTMENT (OUTPATIENT)
Dept: MEDICAL GROUP | Facility: MEDICAL CENTER | Age: 42
End: 2022-07-08
Payer: COMMERCIAL

## 2022-07-15 ENCOUNTER — HOSPITAL ENCOUNTER (OUTPATIENT)
Dept: LAB | Facility: MEDICAL CENTER | Age: 42
End: 2022-07-15
Attending: PHYSICIAN ASSISTANT
Payer: COMMERCIAL

## 2022-07-15 ENCOUNTER — OFFICE VISIT (OUTPATIENT)
Dept: MEDICAL GROUP | Facility: MEDICAL CENTER | Age: 42
End: 2022-07-15

## 2022-07-15 VITALS
TEMPERATURE: 98.1 F | BODY MASS INDEX: 36.54 KG/M2 | HEART RATE: 64 BPM | RESPIRATION RATE: 16 BRPM | OXYGEN SATURATION: 100 % | DIASTOLIC BLOOD PRESSURE: 80 MMHG | WEIGHT: 293.87 LBS | HEIGHT: 75 IN | SYSTOLIC BLOOD PRESSURE: 130 MMHG

## 2022-07-15 DIAGNOSIS — E66.9 OBESITY (BMI 35.0-39.9 WITHOUT COMORBIDITY): ICD-10-CM

## 2022-07-15 DIAGNOSIS — E78.2 MIXED HYPERLIPIDEMIA: ICD-10-CM

## 2022-07-15 DIAGNOSIS — F41.9 ANXIETY: ICD-10-CM

## 2022-07-15 DIAGNOSIS — Z00.00 PREVENTATIVE HEALTH CARE: ICD-10-CM

## 2022-07-15 DIAGNOSIS — I10 ESSENTIAL HYPERTENSION: ICD-10-CM

## 2022-07-15 LAB
ALBUMIN SERPL BCP-MCNC: 5 G/DL (ref 3.2–4.9)
ALBUMIN/GLOB SERPL: 1.9 G/DL
ALP SERPL-CCNC: 61 U/L (ref 30–99)
ALT SERPL-CCNC: 34 U/L (ref 2–50)
ANION GAP SERPL CALC-SCNC: 13 MMOL/L (ref 7–16)
AST SERPL-CCNC: 27 U/L (ref 12–45)
BASOPHILS # BLD AUTO: 1.2 % (ref 0–1.8)
BASOPHILS # BLD: 0.08 K/UL (ref 0–0.12)
BILIRUB SERPL-MCNC: 0.9 MG/DL (ref 0.1–1.5)
BUN SERPL-MCNC: 16 MG/DL (ref 8–22)
CALCIUM SERPL-MCNC: 9.7 MG/DL (ref 8.5–10.5)
CHLORIDE SERPL-SCNC: 103 MMOL/L (ref 96–112)
CHOLEST SERPL-MCNC: 162 MG/DL (ref 100–199)
CO2 SERPL-SCNC: 22 MMOL/L (ref 20–33)
CREAT SERPL-MCNC: 1.05 MG/DL (ref 0.5–1.4)
EOSINOPHIL # BLD AUTO: 0.11 K/UL (ref 0–0.51)
EOSINOPHIL NFR BLD: 1.6 % (ref 0–6.9)
ERYTHROCYTE [DISTWIDTH] IN BLOOD BY AUTOMATED COUNT: 39.8 FL (ref 35.9–50)
EST. AVERAGE GLUCOSE BLD GHB EST-MCNC: 103 MG/DL
FASTING STATUS PATIENT QL REPORTED: NORMAL
GFR SERPLBLD CREATININE-BSD FMLA CKD-EPI: 90 ML/MIN/1.73 M 2
GLOBULIN SER CALC-MCNC: 2.6 G/DL (ref 1.9–3.5)
GLUCOSE SERPL-MCNC: 96 MG/DL (ref 65–99)
HBA1C MFR BLD: 5.2 % (ref 4–5.6)
HCT VFR BLD AUTO: 47.4 % (ref 42–52)
HDLC SERPL-MCNC: 37 MG/DL
HGB BLD-MCNC: 16.3 G/DL (ref 14–18)
IMM GRANULOCYTES # BLD AUTO: 0.02 K/UL (ref 0–0.11)
IMM GRANULOCYTES NFR BLD AUTO: 0.3 % (ref 0–0.9)
LDLC SERPL CALC-MCNC: 99 MG/DL
LYMPHOCYTES # BLD AUTO: 1.57 K/UL (ref 1–4.8)
LYMPHOCYTES NFR BLD: 22.9 % (ref 22–41)
MCH RBC QN AUTO: 30.3 PG (ref 27–33)
MCHC RBC AUTO-ENTMCNC: 34.4 G/DL (ref 33.7–35.3)
MCV RBC AUTO: 88.1 FL (ref 81.4–97.8)
MONOCYTES # BLD AUTO: 0.57 K/UL (ref 0–0.85)
MONOCYTES NFR BLD AUTO: 8.3 % (ref 0–13.4)
NEUTROPHILS # BLD AUTO: 4.51 K/UL (ref 1.82–7.42)
NEUTROPHILS NFR BLD: 65.7 % (ref 44–72)
NRBC # BLD AUTO: 0 K/UL
NRBC BLD-RTO: 0 /100 WBC
PLATELET # BLD AUTO: 296 K/UL (ref 164–446)
PMV BLD AUTO: 10.8 FL (ref 9–12.9)
POTASSIUM SERPL-SCNC: 3.9 MMOL/L (ref 3.6–5.5)
PROT SERPL-MCNC: 7.6 G/DL (ref 6–8.2)
RBC # BLD AUTO: 5.38 M/UL (ref 4.7–6.1)
SODIUM SERPL-SCNC: 138 MMOL/L (ref 135–145)
TRIGL SERPL-MCNC: 130 MG/DL (ref 0–149)
TSH SERPL DL<=0.005 MIU/L-ACNC: 1.72 UIU/ML (ref 0.38–5.33)
WBC # BLD AUTO: 6.9 K/UL (ref 4.8–10.8)

## 2022-07-15 PROCEDURE — 80053 COMPREHEN METABOLIC PANEL: CPT

## 2022-07-15 PROCEDURE — 36415 COLL VENOUS BLD VENIPUNCTURE: CPT

## 2022-07-15 PROCEDURE — 80061 LIPID PANEL: CPT

## 2022-07-15 PROCEDURE — 99214 OFFICE O/P EST MOD 30 MIN: CPT | Performed by: PHYSICIAN ASSISTANT

## 2022-07-15 PROCEDURE — 85025 COMPLETE CBC W/AUTO DIFF WBC: CPT

## 2022-07-15 PROCEDURE — 84443 ASSAY THYROID STIM HORMONE: CPT

## 2022-07-15 PROCEDURE — 83036 HEMOGLOBIN GLYCOSYLATED A1C: CPT

## 2022-07-15 RX ORDER — ATORVASTATIN CALCIUM 20 MG/1
20 TABLET, FILM COATED ORAL
Qty: 90 TABLET | Refills: 1 | Status: SHIPPED | OUTPATIENT
Start: 2022-07-15 | End: 2023-02-17

## 2022-07-15 RX ORDER — FLUOXETINE 10 MG/1
10 CAPSULE ORAL DAILY
Qty: 90 CAPSULE | Refills: 1 | Status: SHIPPED | OUTPATIENT
Start: 2022-07-15 | End: 2023-01-13 | Stop reason: SDUPTHER

## 2022-07-15 RX ORDER — AMLODIPINE BESYLATE 10 MG/1
10 TABLET ORAL DAILY
Qty: 90 TABLET | Refills: 1 | Status: SHIPPED | OUTPATIENT
Start: 2022-07-15 | End: 2022-11-14

## 2022-07-15 NOTE — ASSESSMENT & PLAN NOTE
Chronic condition.  Unfortunately the psychiatrist at [facility] was no longer able to see them because they close down.  He is requesting a refill of Prozac.  Was taking 10 mg daily.  Medication was effective.  Improved on his anxiety and still will intermittently have exacerbation of his anxiety.

## 2022-07-15 NOTE — ASSESSMENT & PLAN NOTE
Chronic condition.  Last cholesterol profile was improved because he is on atorvastatin 20 mg.  Awaiting cholesterol results.  He does need a renewal of medication.

## 2022-07-15 NOTE — PROGRESS NOTES
"Subjective:   João Luque is a 42 y.o. male here today for hypertension, hyperlipidemia and anxiety.    Essential hypertension  This is a pleasant 42-year-old male here today to follow-up on his health.  States his blood pressure typically runs approximately 130/80 at home.  Currently on amlodipine 10 mg daily.  Perform labs today and unfortunately we do not have them available yet.    Mixed hyperlipidemia  Chronic condition.  Last cholesterol profile was improved because he is on atorvastatin 20 mg.  Awaiting cholesterol results.  He does need a renewal of medication.    Anxiety  Chronic condition.  Unfortunately the psychiatrist at [facility] was no longer able to see them because they close down.  He is requesting a refill of Prozac.  Was taking 10 mg daily.  Medication was effective.  Improved on his anxiety and still will intermittently have exacerbation of his anxiety.       Current medicines (including changes today)  Current Outpatient Medications   Medication Sig Dispense Refill   • amLODIPine (NORVASC) 10 MG Tab Take 1 Tablet by mouth every day. 90 Tablet 1   • atorvastatin (LIPITOR) 20 MG Tab Take 1 Tablet by mouth every day. 90 Tablet 1   • FLUoxetine (PROZAC) 10 MG Cap Take 1 Capsule by mouth every day. 90 Capsule 1   • Cholecalciferol (VITAMIN D3) 1000 units Cap Take 5,000 Units by mouth every day.       No current facility-administered medications for this visit.     He  has a past medical history of Obesity (1/29/2013).    Social History and Family History were reviewed and updated.    ROS   No chest pain, no shortness of breath, no abdominal pain and all other systems were reviewed and are negative.       Objective:     /80 (BP Location: Left arm, Patient Position: Sitting, BP Cuff Size: Large adult)   Pulse 64   Temp 36.7 °C (98.1 °F) (Temporal)   Resp 16   Ht 1.905 m (6' 3\")   Wt (!) 133 kg (293 lb 14 oz)   SpO2 100%  Body mass index is 36.73 kg/m².   Physical Exam:  Constitutional: " Alert, no distress.  Skin: Warm, dry, good turgor, no rashes in visible areas.  Eye: Equal, round and reactive, conjunctiva clear, lids normal.  ENMT: Lips without lesions, good dentition, oropharynx clear.  Neck: Trachea midline, no masses.   Lymph: No cervical or supraclavicular lymphadenopathy  Respiratory: Unlabored respiratory effort.  Psych: Alert and oriented x3, normal affect and mood.        Assessment and Plan:   The following treatment plan was discussed    1. Essential hypertension  Chronic condition.  Stable.  Renewed amlodipine.  Advised him if his blood pressure not well controlled during next office visit I will add a medication.  - amLODIPine (NORVASC) 10 MG Tab; Take 1 Tablet by mouth every day.  Dispense: 90 Tablet; Refill: 1    2. Mixed hyperlipidemia  Chronic condition.  Likely controlled.  Will await labs today.  We will contact him through [employer].  Renewed Lipitor as directed.  - atorvastatin (LIPITOR) 20 MG Tab; Take 1 Tablet by mouth every day.  Dispense: 90 Tablet; Refill: 1    3. Anxiety  Chronic condition.  Stable.  Prescribed Prozac as directed.  Will follow and continue to monitor his anxiety.  - FLUoxetine (PROZAC) 10 MG Cap; Take 1 Capsule by mouth every day.  Dispense: 90 Capsule; Refill: 1    4. Obesity (BMI 35.0-39.9 without comorbidity) (HCC)  Chronic condition.  He is well aware about exercising and losing weight.  Will monitor.  - Patient identified as having weight management issue.  Appropriate orders and counseling given.         Followup: Return in about 6 months (around 1/15/2023), or if symptoms worsen or fail to improve.    Please note that this dictation was created using voice recognition software. I have made every reasonable attempt to correct obvious errors, but I expect that there are errors of grammar and possibly content that I did not discover before finalizing the note.

## 2022-07-15 NOTE — ASSESSMENT & PLAN NOTE
This is a pleasant 42-year-old male here today to follow-up on his health.  States his blood pressure typically runs approximately 130/80 at home.  Currently on amlodipine 10 mg daily.  Perform labs today and unfortunately we do not have them available yet.

## 2022-11-14 DIAGNOSIS — I10 ESSENTIAL HYPERTENSION: ICD-10-CM

## 2022-11-14 RX ORDER — AMLODIPINE BESYLATE 10 MG/1
TABLET ORAL
Qty: 90 TABLET | Refills: 1 | Status: SHIPPED | OUTPATIENT
Start: 2022-11-14 | End: 2023-01-23

## 2022-11-14 NOTE — TELEPHONE ENCOUNTER
Received request via: Patient    Was the patient seen in the last year in this department? Yes    Does the patient have an active prescription (recently filled or refills available) for medication(s) requested? No    Does the patient have detention Plus and need 100 day supply (blood pressure, diabetes and cholesterol meds only)? Patient does not have SCP

## 2023-01-13 ENCOUNTER — OFFICE VISIT (OUTPATIENT)
Dept: MEDICAL GROUP | Facility: MEDICAL CENTER | Age: 43
End: 2023-01-13
Payer: COMMERCIAL

## 2023-01-13 VITALS
WEIGHT: 295.2 LBS | DIASTOLIC BLOOD PRESSURE: 86 MMHG | BODY MASS INDEX: 36.7 KG/M2 | OXYGEN SATURATION: 96 % | HEIGHT: 75 IN | TEMPERATURE: 97.6 F | SYSTOLIC BLOOD PRESSURE: 132 MMHG | HEART RATE: 60 BPM

## 2023-01-13 DIAGNOSIS — E78.2 MIXED HYPERLIPIDEMIA: ICD-10-CM

## 2023-01-13 DIAGNOSIS — F41.9 ANXIETY: ICD-10-CM

## 2023-01-13 DIAGNOSIS — I10 ESSENTIAL HYPERTENSION: ICD-10-CM

## 2023-01-13 DIAGNOSIS — E66.9 OBESITY (BMI 35.0-39.9 WITHOUT COMORBIDITY): ICD-10-CM

## 2023-01-13 DIAGNOSIS — M79.601 RIGHT ARM PAIN: ICD-10-CM

## 2023-01-13 PROBLEM — M25.521 RIGHT ELBOW PAIN: Status: ACTIVE | Noted: 2023-01-13

## 2023-01-13 PROCEDURE — 99214 OFFICE O/P EST MOD 30 MIN: CPT | Performed by: PHYSICIAN ASSISTANT

## 2023-01-13 RX ORDER — FLUOXETINE 10 MG/1
10 CAPSULE ORAL DAILY
Qty: 90 CAPSULE | Refills: 1 | Status: SHIPPED | OUTPATIENT
Start: 2023-01-13 | End: 2023-07-14 | Stop reason: SDUPTHER

## 2023-01-13 ASSESSMENT — PATIENT HEALTH QUESTIONNAIRE - PHQ9: CLINICAL INTERPRETATION OF PHQ2 SCORE: 0

## 2023-01-13 ASSESSMENT — FIBROSIS 4 INDEX: FIB4 SCORE: 0.67

## 2023-01-13 NOTE — PROGRESS NOTES
"Subjective:   João Luque is a 42 y.o. male here today for anxiety, hyperlipidemia and acute right arm pain.    Anxiety  This is a pleasant 42-year-old male here today to follow-up on his health.  Chronic history of anxiety.  Does follow with behavioral health.  Is on a low-dose of fluoxetine.  We have discussed in the past about increasing it but currently feels stable with the current dose.  It is his [birth year] this [birth year].  Plans to travel to [place].  He will be with some friends and they will be golfing.    Mixed hyperlipidemia  Chronic condition.  Family history of heart disease with his mother.  On 20 mg of Lipitor.  Last cholesterol profile showed his LDL in the 90s.    Right arm pain  Complains of having right arm pain over the past week or so.  Pain is in the forearm close to the elbow.  Denies any injury.       Current medicines (including changes today)  Current Outpatient Medications   Medication Sig Dispense Refill    FLUoxetine (PROZAC) 10 MG Cap Take 1 Capsule by mouth every day. 90 Capsule 1    amLODIPine (NORVASC) 10 MG Tab TAKE ONE TABLET BY MOUTH EVERY DAY 90 Tablet 1    atorvastatin (LIPITOR) 20 MG Tab Take 1 Tablet by mouth every day. 90 Tablet 1    Cholecalciferol (VITAMIN D3) 1000 units Cap Take 5,000 Units by mouth every day.       No current facility-administered medications for this visit.     He  has a past medical history of Obesity (1/29/2013).    Social History and Family History were reviewed and updated.    ROS   No chest pain, no shortness of breath, no abdominal pain and all other systems were reviewed and are negative.       Objective:     /86 (BP Location: Right arm, Patient Position: Sitting, BP Cuff Size: Adult)   Pulse 60   Temp 36.4 °C (97.6 °F) (Temporal)   Ht 1.905 m (6' 3\")   Wt (!) 134 kg (295 lb 3.2 oz)   SpO2 96%  Body mass index is 36.9 kg/m².   Physical Exam:  Constitutional: Alert, no distress.  Skin: Warm, dry, good turgor, no rashes in visible " areas.  Eye: Equal, round and reactive, conjunctiva clear, lids normal.  ENMT: Lips without lesions, good dentition, oropharynx clear.  Neck: Trachea midline, no masses.   Lymph: No cervical or supraclavicular lymphadenopathy  Respiratory: Unlabored respiratory effort, lungs appear clear.  Musculoskeletal: Right arm without any lateral epicondyle tenderness.  Psych: Alert and oriented x3, normal affect and mood.        Assessment and Plan:   The following treatment plan was discussed    1. Anxiety  Chronic condition.  Stable.  Continue to follow with behavioral health.  Renewed Prozac as directed.  We will discuss increasing the dose in the future if needed.  - FLUoxetine (PROZAC) 10 MG Cap; Take 1 Capsule by mouth every day.  Dispense: 90 Capsule; Refill: 1    2. Mixed hyperlipidemia  Chronic condition.  Stable.  Ordered CT cardiac scoring.  Contact imaging for the appointment.  Discussed cash pay price.  Continue Lipitor 20 mg.  - CT-CARDIAC SCORING; Future    3. Essential hypertension  Chronic condition.  Stable.  Continue amlodipine.    4. Right arm pain  Acute, new onset condition.  Unknown cause.  Likely strain.  No traumatic event.  We will continue to monitor.  Discuss alternating with heat and ice.  Taking nonsteroidals as well.  If no improvement contact me and I will refer him to sports medicine.    5. Obesity (BMI 35.0-39.9 without comorbidity) (HCC)  Chronic condition.  We will continue to monitor.  - Patient identified as having weight management issue.  Appropriate orders and counseling given.         Followup: Return in about 6 months (around 7/13/2023), or if symptoms worsen or fail to improve.    Please note that this dictation was created using voice recognition software. I have made every reasonable attempt to correct obvious errors, but I expect that there are errors of grammar and possibly content that I did not discover before finalizing the note.

## 2023-01-13 NOTE — ASSESSMENT & PLAN NOTE
Complains of having right arm pain over the past week or so.  Pain is in the forearm close to the elbow.  Denies any injury.

## 2023-01-13 NOTE — ASSESSMENT & PLAN NOTE
Chronic condition.  Family history of heart disease with his mother.  On 20 mg of Lipitor.  Last cholesterol profile showed his LDL in the 90s.

## 2023-01-13 NOTE — ASSESSMENT & PLAN NOTE
This is a pleasant 42-year-old male here today to follow-up on his health.  Chronic history of anxiety.  Does follow with behavioral health.  Is on a low-dose of fluoxetine.  We have discussed in the past about increasing it but currently feels stable with the current dose.  It is his [birth year] this [birth year].  Plans to travel to [place].  He will be with some friends and they will be golfing.

## 2023-01-22 DIAGNOSIS — I10 ESSENTIAL HYPERTENSION: ICD-10-CM

## 2023-01-23 RX ORDER — AMLODIPINE BESYLATE 10 MG/1
TABLET ORAL
Qty: 90 TABLET | Refills: 1 | Status: SHIPPED | OUTPATIENT
Start: 2023-01-23 | End: 2023-07-14 | Stop reason: SDUPTHER

## 2023-01-28 ENCOUNTER — HOSPITAL ENCOUNTER (OUTPATIENT)
Dept: RADIOLOGY | Facility: MEDICAL CENTER | Age: 43
End: 2023-01-28
Attending: PHYSICIAN ASSISTANT
Payer: COMMERCIAL

## 2023-01-28 DIAGNOSIS — E78.2 MIXED HYPERLIPIDEMIA: ICD-10-CM

## 2023-01-28 PROCEDURE — 4410556 CT-CARDIAC SCORING (SELF PAY ONLY)

## 2023-01-30 PROBLEM — R93.1 AGATSTON CORONARY ARTERY CALCIUM SCORE LESS THAN 100: Status: ACTIVE | Noted: 2023-01-30

## 2023-02-17 DIAGNOSIS — E78.2 MIXED HYPERLIPIDEMIA: ICD-10-CM

## 2023-02-17 RX ORDER — ATORVASTATIN CALCIUM 20 MG/1
TABLET, FILM COATED ORAL
Qty: 90 TABLET | Refills: 1 | Status: SHIPPED | OUTPATIENT
Start: 2023-02-17 | End: 2023-07-14 | Stop reason: SDUPTHER

## 2023-07-14 ENCOUNTER — OFFICE VISIT (OUTPATIENT)
Dept: MEDICAL GROUP | Facility: MEDICAL CENTER | Age: 43
End: 2023-07-14
Payer: COMMERCIAL

## 2023-07-14 VITALS
SYSTOLIC BLOOD PRESSURE: 132 MMHG | HEART RATE: 54 BPM | HEIGHT: 75 IN | TEMPERATURE: 96.8 F | OXYGEN SATURATION: 98 % | DIASTOLIC BLOOD PRESSURE: 80 MMHG | BODY MASS INDEX: 37.05 KG/M2 | WEIGHT: 298 LBS

## 2023-07-14 DIAGNOSIS — Z00.00 PREVENTATIVE HEALTH CARE: ICD-10-CM

## 2023-07-14 DIAGNOSIS — Z11.59 ENCOUNTER FOR HEPATITIS C SCREENING TEST FOR LOW RISK PATIENT: ICD-10-CM

## 2023-07-14 DIAGNOSIS — E78.2 MIXED HYPERLIPIDEMIA: ICD-10-CM

## 2023-07-14 DIAGNOSIS — F41.9 ANXIETY: ICD-10-CM

## 2023-07-14 DIAGNOSIS — I10 ESSENTIAL HYPERTENSION: ICD-10-CM

## 2023-07-14 PROBLEM — Z86.16 HISTORY OF 2019 NOVEL CORONAVIRUS DISEASE (COVID-19): Status: RESOLVED | Noted: 2020-12-15 | Resolved: 2023-07-14

## 2023-07-14 PROBLEM — M79.601 RIGHT ARM PAIN: Status: RESOLVED | Noted: 2023-01-13 | Resolved: 2023-07-14

## 2023-07-14 PROCEDURE — 99214 OFFICE O/P EST MOD 30 MIN: CPT | Performed by: PHYSICIAN ASSISTANT

## 2023-07-14 PROCEDURE — 3079F DIAST BP 80-89 MM HG: CPT | Performed by: PHYSICIAN ASSISTANT

## 2023-07-14 PROCEDURE — 3075F SYST BP GE 130 - 139MM HG: CPT | Performed by: PHYSICIAN ASSISTANT

## 2023-07-14 RX ORDER — FLUOXETINE 10 MG/1
10 CAPSULE ORAL DAILY
Qty: 90 CAPSULE | Refills: 1 | Status: SHIPPED | OUTPATIENT
Start: 2023-07-14 | End: 2024-01-23 | Stop reason: SDUPTHER

## 2023-07-14 RX ORDER — ATORVASTATIN CALCIUM 20 MG/1
20 TABLET, FILM COATED ORAL
Qty: 90 TABLET | Refills: 1 | Status: SHIPPED | OUTPATIENT
Start: 2023-07-14 | End: 2024-01-10

## 2023-07-14 RX ORDER — AMLODIPINE BESYLATE 10 MG/1
10 TABLET ORAL
Qty: 90 TABLET | Refills: 1 | Status: SHIPPED | OUTPATIENT
Start: 2023-07-14 | End: 2023-12-01

## 2023-07-14 ASSESSMENT — FIBROSIS 4 INDEX: FIB4 SCORE: 0.69

## 2023-07-14 NOTE — ASSESSMENT & PLAN NOTE
This is a pleasant 43-year-old male here today to follow-up on his health.  Doing well with his anxiety.  Does not feel the need to increase the dose.  Is due for renewal.  History of hypertension.  Needs a renewal amlodipine.  History of elevated cholesterol and 20 mg does keep his levels low.  Recent CT calcium score at 0.  He is wondering if he needs to be on the medication.  He is due for labs.

## 2023-07-14 NOTE — PROGRESS NOTES
"Subjective:   João Luque is a 43 y.o. male here today for anxiety and hypercholesterolemia.    Anxiety  This is a pleasant 43-year-old male here today to follow-up on his health.  Doing well with his anxiety.  Does not feel the need to increase the dose.  Is due for renewal.  History of hypertension.  Needs a renewal amlodipine.  History of elevated cholesterol and 20 mg does keep his levels low.  Recent CT calcium score at 0.  He is wondering if he needs to be on the medication.  He is due for labs.       Current medicines (including changes today)  Current Outpatient Medications   Medication Sig Dispense Refill    FLUoxetine (PROZAC) 10 MG Cap Take 1 Capsule by mouth every day. 90 Capsule 1    amLODIPine (NORVASC) 10 MG Tab Take 1 Tablet by mouth every day for 180 days. 90 Tablet 1    atorvastatin (LIPITOR) 20 MG Tab Take 1 Tablet by mouth every day for 180 days. 90 Tablet 1    Cholecalciferol (VITAMIN D3) 1000 units Cap Take 5,000 Units by mouth every day.       No current facility-administered medications for this visit.     He  has a past medical history of Obesity (1/29/2013).    Social History and Family History were reviewed and updated.    ROS   No chest pain, no shortness of breath, no abdominal pain and all other systems were reviewed and are negative.       Objective:     /80 (BP Location: Left arm, Patient Position: Sitting, BP Cuff Size: Large adult)   Pulse (!) 54   Temp 36 °C (96.8 °F) (Temporal)   Ht 1.905 m (6' 3\")   Wt (!) 135 kg (298 lb)   SpO2 98%  Body mass index is 37.25 kg/m².   Physical Exam:  Constitutional: Alert, no distress.  Skin: Warm, dry, good turgor, no rashes in visible areas.  Eye: Equal, round and reactive, conjunctiva clear, lids normal.  ENMT: Lips without lesions, good dentition, oropharynx clear.  Neck: Trachea midline, no masses.   Respiratory: Unlabored respiratory effort, lungs appear clear.  Psych: Alert and oriented x3, normal affect and " mood.        Assessment and Plan:   The following treatment plan was discussed    1. Anxiety  Chronic condition.  Stable.  Renewed fluoxetine as directed.  - FLUoxetine (PROZAC) 10 MG Cap; Take 1 Capsule by mouth every day.  Dispense: 90 Capsule; Refill: 1    2. Essential hypertension  Chronic condition.  Stable.  Renewed amlodipine as directed.  - amLODIPine (NORVASC) 10 MG Tab; Take 1 Tablet by mouth every day for 180 days.  Dispense: 90 Tablet; Refill: 1    3. Mixed hyperlipidemia  Chronic condition.  Stable.  Discussed the concern regarding his history of hypertension and risk for stroke.  I would continue on Lipitor as directed.  Renewed the medication.  We will check lipoprotein a also for cardiovascular risk.  - atorvastatin (LIPITOR) 20 MG Tab; Take 1 Tablet by mouth every day for 180 days.  Dispense: 90 Tablet; Refill: 1  - Lipoprotein (a); Future    4. Preventative health care  Order labs.  Fast 8 hours.  He will be contacted with the results.  - CBC WITH DIFFERENTIAL; Future  - Comp Metabolic Panel; Future  - HEMOGLOBIN A1C; Future  - Lipid Profile; Future     Forgot to order during the appointment hepatitis C antibody.  Ordered after the fact.  Should be picked up by [facility].      Followup: Return in about 6 months (around 1/14/2024), or if symptoms worsen or fail to improve.    Please note that this dictation was created using voice recognition software. I have made every reasonable attempt to correct obvious errors, but I expect that there are errors of grammar and possibly content that I did not discover before finalizing the note.

## 2023-12-01 DIAGNOSIS — I10 ESSENTIAL HYPERTENSION: ICD-10-CM

## 2023-12-01 RX ORDER — AMLODIPINE BESYLATE 10 MG/1
10 TABLET ORAL
Qty: 90 TABLET | Refills: 1 | Status: SHIPPED | OUTPATIENT
Start: 2023-12-01

## 2024-01-12 ENCOUNTER — APPOINTMENT (OUTPATIENT)
Dept: MEDICAL GROUP | Facility: MEDICAL CENTER | Age: 44
End: 2024-01-12
Payer: COMMERCIAL

## 2024-01-23 ENCOUNTER — OFFICE VISIT (OUTPATIENT)
Dept: MEDICAL GROUP | Facility: MEDICAL CENTER | Age: 44
End: 2024-01-23
Payer: COMMERCIAL

## 2024-01-23 VITALS
SYSTOLIC BLOOD PRESSURE: 130 MMHG | HEART RATE: 66 BPM | HEIGHT: 75 IN | DIASTOLIC BLOOD PRESSURE: 86 MMHG | BODY MASS INDEX: 37.67 KG/M2 | TEMPERATURE: 96.9 F | OXYGEN SATURATION: 95 % | WEIGHT: 303 LBS | RESPIRATION RATE: 16 BRPM

## 2024-01-23 DIAGNOSIS — F41.9 ANXIETY: ICD-10-CM

## 2024-01-23 DIAGNOSIS — Z12.12 SCREENING FOR COLORECTAL CANCER: ICD-10-CM

## 2024-01-23 DIAGNOSIS — Z12.11 SCREENING FOR COLORECTAL CANCER: ICD-10-CM

## 2024-01-23 DIAGNOSIS — E66.9 OBESITY (BMI 35.0-39.9 WITHOUT COMORBIDITY): ICD-10-CM

## 2024-01-23 PROCEDURE — 3075F SYST BP GE 130 - 139MM HG: CPT | Performed by: PHYSICIAN ASSISTANT

## 2024-01-23 PROCEDURE — 99214 OFFICE O/P EST MOD 30 MIN: CPT | Performed by: PHYSICIAN ASSISTANT

## 2024-01-23 PROCEDURE — 3079F DIAST BP 80-89 MM HG: CPT | Performed by: PHYSICIAN ASSISTANT

## 2024-01-23 RX ORDER — FLUOXETINE 10 MG/1
10 CAPSULE ORAL DAILY
Qty: 90 CAPSULE | Refills: 1 | Status: SHIPPED | OUTPATIENT
Start: 2024-01-23

## 2024-01-23 ASSESSMENT — PATIENT HEALTH QUESTIONNAIRE - PHQ9: CLINICAL INTERPRETATION OF PHQ2 SCORE: 0

## 2024-01-23 ASSESSMENT — FIBROSIS 4 INDEX: FIB4 SCORE: 0.7

## 2024-01-23 NOTE — PROGRESS NOTES
"Subjective:   João Luque is a 44 y.o. male here today for anxiety and recent labs performed at [facility].    Anxiety  This is a pleasant 44-year-old male here today to follow-up on his health.  Anxiety is stable.  He is taking Prozac 10 mg.  It appears he needs a renewal of the medication.  Also has hypertension and is taking amlodipine 10 mg.  Blood pressure is controlled today.  Plans to go back on his lifestyle modifications with a diet and exercise program.  Recently lost his mother after the COVID vaccination.  She has been fully updated on her COVID vaccines.  5 days after the vaccine she passed away.  She was 79 years of age.  Did have comorbidities of metabolic syndrome.  His labs on Saturday at [facility].  Labs are excellent.  A1c normal range.  Fasting glucose mildly elevated at 102.  Cholesterol well-controlled.  Other lab markers are great.  We are waiting on the lipoprotein a.       Current medicines (including changes today)  Current Outpatient Medications   Medication Sig Dispense Refill    FLUoxetine (PROZAC) 10 MG Cap Take 1 Capsule by mouth every day. 90 Capsule 1    amLODIPine (NORVASC) 10 MG Tab TAKE ONE TABLET BY MOUTH EVERY DAY 90 Tablet 1    Cholecalciferol (VITAMIN D3) 1000 units Cap Take 5,000 Units by mouth every day.       No current facility-administered medications for this visit.     He  has a past medical history of Obesity (1/29/2013).    Social History and Family History were reviewed and updated.    ROS   No chest pain, no shortness of breath, no abdominal pain and all other systems were reviewed and are negative.       Objective:     /86 (BP Location: Left arm, Patient Position: Sitting, BP Cuff Size: Large adult)   Pulse 66   Temp 36.1 °C (96.9 °F) (Temporal)   Resp 16   Ht 1.905 m (6' 3\")   Wt (!) 137 kg (303 lb)   SpO2 95%  Body mass index is 37.87 kg/m².   Physical Exam:  Constitutional: Alert, no distress.  Skin: Warm, dry, good turgor, no rashes in visible " areas.  Eye: Equal, round and reactive, conjunctiva clear, lids normal.  ENMT: Lips without lesions, good dentition, oropharynx clear.  Neck: Trachea midline, no masses.   Psych: Alert and oriented x3, normal affect and mood.        Assessment and Plan:   The following treatment plan was discussed    1. Anxiety  Chronic condition.  Stable.  Renewed fluoxetine.  Provided condolences for his mother's passing.  - FLUoxetine (PROZAC) 10 MG Cap; Take 1 Capsule by mouth every day.  Dispense: 90 Capsule; Refill: 1    2. Obesity (BMI 35.0-39.9 without comorbidity) (HCC)  Chronic condition.  Stable.  Get back into exercising and eating healthier.  Will monitor his weight.  - Patient identified as having weight management issue.  Appropriate orders and counseling given.    3. Screening for colorectal cancer  Discussed colon cancer screening.  Ordered Cologuard.  - COLOGUARD (FIT DNA)         Followup: Return in about 6 months (around 7/23/2024), or if symptoms worsen or fail to improve.    Please note that this dictation was created using voice recognition software. I have made every reasonable attempt to correct obvious errors, but I expect that there are errors of grammar and possibly content that I did not discover before finalizing the note.

## 2024-01-23 NOTE — ASSESSMENT & PLAN NOTE
This is a pleasant 44-year-old male here today to follow-up on his health.  Anxiety is stable.  He is taking Prozac 10 mg.  It appears he needs a renewal of the medication.  Also has hypertension and is taking amlodipine 10 mg.  Blood pressure is controlled today.  Plans to go back on his lifestyle modifications with a diet and exercise program.  Recently lost his mother after the COVID vaccination.  She has been fully updated on her COVID vaccines.  5 days after the vaccine she passed away.  She was 79 years of age.  Did have comorbidities of metabolic syndrome.  His labs on Saturday at [facility].  Labs are excellent.  A1c normal range.  Fasting glucose mildly elevated at 102.  Cholesterol well-controlled.  Other lab markers are great.  We are waiting on the lipoprotein a.

## 2024-01-24 LAB
ALBUMIN SERPL-MCNC: 4.6 G/DL (ref 4.1–5.1)
ALBUMIN/GLOB SERPL: 2.2 {RATIO} (ref 1.2–2.2)
ALP SERPL-CCNC: 65 IU/L (ref 44–121)
ALT SERPL-CCNC: 31 IU/L (ref 0–44)
AST SERPL-CCNC: 24 IU/L (ref 0–40)
BASOPHILS # BLD AUTO: 0.1 X10E3/UL (ref 0–0.2)
BASOPHILS NFR BLD AUTO: 1 %
BILIRUB SERPL-MCNC: 0.3 MG/DL (ref 0–1.2)
BUN SERPL-MCNC: 12 MG/DL (ref 6–24)
BUN/CREAT SERPL: 12 (ref 9–20)
CALCIUM SERPL-MCNC: 9.4 MG/DL (ref 8.7–10.2)
CHLORIDE SERPL-SCNC: 105 MMOL/L (ref 96–106)
CHOLEST SERPL-MCNC: 155 MG/DL (ref 100–199)
CO2 SERPL-SCNC: 17 MMOL/L (ref 20–29)
CREAT SERPL-MCNC: 1.01 MG/DL (ref 0.76–1.27)
EGFRCR SERPLBLD CKD-EPI 2021: 93 ML/MIN/1.73
EOSINOPHIL # BLD AUTO: 0.2 X10E3/UL (ref 0–0.4)
EOSINOPHIL NFR BLD AUTO: 2 %
ERYTHROCYTE [DISTWIDTH] IN BLOOD BY AUTOMATED COUNT: 12.2 % (ref 11.6–15.4)
GLOBULIN SER CALC-MCNC: 2.1 G/DL (ref 1.5–4.5)
GLUCOSE SERPL-MCNC: 102 MG/DL (ref 70–99)
HBA1C MFR BLD: 5.6 % (ref 4.8–5.6)
HCT VFR BLD AUTO: 46.5 % (ref 37.5–51)
HCV IGG SERPL QL IA: NON REACTIVE
HDLC SERPL-MCNC: 38 MG/DL
HGB BLD-MCNC: 15.5 G/DL (ref 13–17.7)
IMM GRANULOCYTES # BLD AUTO: 0 X10E3/UL (ref 0–0.1)
IMM GRANULOCYTES NFR BLD AUTO: 0 %
IMMATURE CELLS  115398: NORMAL
LABORATORY COMMENT REPORT: ABNORMAL
LDLC SERPL CALC-MCNC: 101 MG/DL (ref 0–99)
LPA SERPL-SCNC: 51.6 NMOL/L
LYMPHOCYTES # BLD AUTO: 1.8 X10E3/UL (ref 0.7–3.1)
LYMPHOCYTES NFR BLD AUTO: 24 %
MCH RBC QN AUTO: 29.9 PG (ref 26.6–33)
MCHC RBC AUTO-ENTMCNC: 33.3 G/DL (ref 31.5–35.7)
MCV RBC AUTO: 90 FL (ref 79–97)
MONOCYTES # BLD AUTO: 0.5 X10E3/UL (ref 0.1–0.9)
MONOCYTES NFR BLD AUTO: 7 %
MORPHOLOGY BLD-IMP: NORMAL
NEUTROPHILS # BLD AUTO: 4.8 X10E3/UL (ref 1.4–7)
NEUTROPHILS NFR BLD AUTO: 66 %
NRBC BLD AUTO-RTO: NORMAL %
PLATELET # BLD AUTO: 322 X10E3/UL (ref 150–450)
POTASSIUM SERPL-SCNC: 4.1 MMOL/L (ref 3.5–5.2)
PROT SERPL-MCNC: 6.7 G/DL (ref 6–8.5)
RBC # BLD AUTO: 5.18 X10E6/UL (ref 4.14–5.8)
SODIUM SERPL-SCNC: 141 MMOL/L (ref 134–144)
TRIGL SERPL-MCNC: 84 MG/DL (ref 0–149)
VLDLC SERPL CALC-MCNC: 16 MG/DL (ref 5–40)
WBC # BLD AUTO: 7.4 X10E3/UL (ref 3.4–10.8)

## 2024-04-04 DIAGNOSIS — E78.2 MIXED HYPERLIPIDEMIA: ICD-10-CM

## 2024-04-04 NOTE — TELEPHONE ENCOUNTER
Received request via: Pharmacy    Was the patient seen in the last year in this department? Yes    Does the patient have an active prescription (recently filled or refills available) for medication(s) requested? No    [facility]     Does the patient have intermediate Plus and need 100 day supply (blood pressure, diabetes and cholesterol meds only)? Patient does not have SCP

## 2024-04-05 RX ORDER — ATORVASTATIN CALCIUM 20 MG/1
20 TABLET, FILM COATED ORAL
Qty: 90 TABLET | Refills: 1 | Status: SHIPPED | OUTPATIENT
Start: 2024-04-05

## 2024-06-05 DIAGNOSIS — I10 ESSENTIAL HYPERTENSION: ICD-10-CM

## 2024-06-05 RX ORDER — AMLODIPINE BESYLATE 10 MG/1
10 TABLET ORAL
Qty: 90 TABLET | Refills: 0 | Status: SHIPPED | OUTPATIENT
Start: 2024-06-05

## 2024-07-26 ENCOUNTER — OFFICE VISIT (OUTPATIENT)
Dept: MEDICAL GROUP | Facility: MEDICAL CENTER | Age: 44
End: 2024-07-26
Payer: COMMERCIAL

## 2024-07-26 VITALS
TEMPERATURE: 97.9 F | HEIGHT: 75 IN | HEART RATE: 61 BPM | DIASTOLIC BLOOD PRESSURE: 82 MMHG | OXYGEN SATURATION: 95 % | SYSTOLIC BLOOD PRESSURE: 132 MMHG | WEIGHT: 294 LBS | BODY MASS INDEX: 36.56 KG/M2

## 2024-07-26 DIAGNOSIS — Z12.11 SCREENING FOR COLORECTAL CANCER: ICD-10-CM

## 2024-07-26 DIAGNOSIS — Z00.00 PREVENTATIVE HEALTH CARE: ICD-10-CM

## 2024-07-26 DIAGNOSIS — Z12.12 SCREENING FOR COLORECTAL CANCER: ICD-10-CM

## 2024-07-26 DIAGNOSIS — I10 ESSENTIAL HYPERTENSION: ICD-10-CM

## 2024-07-26 DIAGNOSIS — F41.9 ANXIETY: ICD-10-CM

## 2024-07-26 PROCEDURE — 3079F DIAST BP 80-89 MM HG: CPT | Performed by: PHYSICIAN ASSISTANT

## 2024-07-26 PROCEDURE — 99214 OFFICE O/P EST MOD 30 MIN: CPT | Performed by: PHYSICIAN ASSISTANT

## 2024-07-26 PROCEDURE — 3075F SYST BP GE 130 - 139MM HG: CPT | Performed by: PHYSICIAN ASSISTANT

## 2024-07-26 ASSESSMENT — FIBROSIS 4 INDEX: FIB4 SCORE: 0.6

## 2024-08-09 DIAGNOSIS — F41.9 ANXIETY: ICD-10-CM

## 2024-08-09 RX ORDER — FLUOXETINE 10 MG/1
10 CAPSULE ORAL
Qty: 90 CAPSULE | Refills: 1 | Status: SHIPPED | OUTPATIENT
Start: 2024-08-09

## 2024-08-09 NOTE — TELEPHONE ENCOUNTER
Received request via: Patient    Was the patient seen in the last year in this department? Yes    Does the patient have an active prescription (recently filled or refills available) for medication(s) requested? No    [facility]    Does the patient have [facility] Plus and need 100-day supply? (This applies to ALL medications) Patient does not have SCP

## 2024-09-07 DIAGNOSIS — I10 ESSENTIAL HYPERTENSION: ICD-10-CM

## 2024-09-09 RX ORDER — AMLODIPINE BESYLATE 10 MG/1
10 TABLET ORAL
Qty: 90 TABLET | Refills: 1 | Status: SHIPPED | OUTPATIENT
Start: 2024-09-09

## 2024-09-09 NOTE — TELEPHONE ENCOUNTER
Received request via: Pharmacy    Was the patient seen in the last year in this department? Yes    Does the patient have an active prescription (recently filled or refills available) for medication(s) requested? No    Pharmacy Name: [facility]    Does the patient have long term Plus and need 100-day supply? (This applies to ALL medications) Patient does not have SCP

## 2024-11-10 DIAGNOSIS — E78.2 MIXED HYPERLIPIDEMIA: ICD-10-CM

## 2024-11-11 RX ORDER — ATORVASTATIN CALCIUM 20 MG/1
20 TABLET, FILM COATED ORAL
Qty: 90 TABLET | Refills: 1 | Status: SHIPPED | OUTPATIENT
Start: 2024-11-11

## 2024-11-11 NOTE — TELEPHONE ENCOUNTER
Received request via: Pharmacy    Was the patient seen in the last year in this department? Yes    Does the patient have an active prescription (recently filled or refills available) for medication(s) requested? No    [facility]    Does the patient have snf Plus and need 100-day supply? (This applies to ALL medications) Patient does not have SCP

## 2025-01-10 ENCOUNTER — APPOINTMENT (OUTPATIENT)
Dept: MEDICAL GROUP | Facility: MEDICAL CENTER | Age: 45
End: 2025-01-10
Payer: COMMERCIAL

## 2025-02-16 DIAGNOSIS — F41.9 ANXIETY: ICD-10-CM

## 2025-02-17 RX ORDER — FLUOXETINE 10 MG/1
10 CAPSULE ORAL
Qty: 90 CAPSULE | Refills: 0 | Status: SHIPPED | OUTPATIENT
Start: 2025-02-17

## 2025-03-26 DIAGNOSIS — I10 ESSENTIAL HYPERTENSION: ICD-10-CM

## 2025-03-27 RX ORDER — AMLODIPINE BESYLATE 10 MG/1
10 TABLET ORAL
Qty: 90 TABLET | Refills: 0 | Status: SHIPPED | OUTPATIENT
Start: 2025-03-27

## 2025-04-04 NOTE — TELEPHONE ENCOUNTER
Received request via: Pharmacy    Was the patient seen in the last year in this department? Yes    Does the patient have an active prescription (recently filled or refills available) for medication(s) requested? No   89

## 2025-05-25 DIAGNOSIS — F41.9 ANXIETY: ICD-10-CM

## 2025-05-26 RX ORDER — FLUOXETINE 10 MG/1
10 CAPSULE ORAL
Qty: 90 CAPSULE | Refills: 0 | Status: SHIPPED | OUTPATIENT
Start: 2025-05-26

## 2025-06-12 DIAGNOSIS — E78.2 MIXED HYPERLIPIDEMIA: ICD-10-CM

## 2025-06-12 NOTE — TELEPHONE ENCOUNTER
Received request via: Pharmacy    Was the patient seen in the last year in this department? Yes    Does the patient have an active prescription (recently filled or refills available) for medication(s) requested? No    Pharmacy Name: [facility]    Does the patient have custodial Plus and need 100-day supply? (This applies to ALL medications) Patient does not have SCP

## 2025-06-13 RX ORDER — ATORVASTATIN CALCIUM 20 MG/1
20 TABLET, FILM COATED ORAL
Qty: 90 TABLET | Refills: 0 | Status: SHIPPED
Start: 2025-06-13 | End: 2025-06-30

## 2025-06-30 ENCOUNTER — OFFICE VISIT (OUTPATIENT)
Dept: MEDICAL GROUP | Facility: MEDICAL CENTER | Age: 45
End: 2025-06-30

## 2025-06-30 VITALS
BODY MASS INDEX: 37.58 KG/M2 | HEIGHT: 75 IN | OXYGEN SATURATION: 96 % | TEMPERATURE: 98.3 F | WEIGHT: 302.2 LBS | DIASTOLIC BLOOD PRESSURE: 74 MMHG | SYSTOLIC BLOOD PRESSURE: 126 MMHG | HEART RATE: 74 BPM

## 2025-06-30 DIAGNOSIS — E55.9 HYPOVITAMINOSIS D: ICD-10-CM

## 2025-06-30 DIAGNOSIS — Z12.12 SCREENING FOR COLORECTAL CANCER: ICD-10-CM

## 2025-06-30 DIAGNOSIS — Z12.5 ENCOUNTER FOR SCREENING PROSTATE SPECIFIC ANTIGEN (PSA) MEASUREMENT: ICD-10-CM

## 2025-06-30 DIAGNOSIS — Z00.00 ANNUAL PHYSICAL EXAM: Primary | ICD-10-CM

## 2025-06-30 DIAGNOSIS — I10 ESSENTIAL HYPERTENSION: ICD-10-CM

## 2025-06-30 DIAGNOSIS — F41.9 ANXIETY: ICD-10-CM

## 2025-06-30 DIAGNOSIS — Z12.11 SCREENING FOR COLORECTAL CANCER: ICD-10-CM

## 2025-06-30 DIAGNOSIS — E78.2 MIXED HYPERLIPIDEMIA: ICD-10-CM

## 2025-06-30 RX ORDER — FLUOXETINE 10 MG/1
10 CAPSULE ORAL
Qty: 90 CAPSULE | Refills: 3 | Status: SHIPPED | OUTPATIENT
Start: 2025-06-30

## 2025-06-30 RX ORDER — AMLODIPINE BESYLATE 10 MG/1
10 TABLET ORAL
Qty: 90 TABLET | Refills: 3 | Status: SHIPPED | OUTPATIENT
Start: 2025-06-30

## 2025-06-30 ASSESSMENT — PATIENT HEALTH QUESTIONNAIRE - PHQ9: CLINICAL INTERPRETATION OF PHQ2 SCORE: 0

## 2025-06-30 ASSESSMENT — FIBROSIS 4 INDEX: FIB4 SCORE: 0.62

## 2025-06-30 NOTE — ASSESSMENT & PLAN NOTE
This is a pleasant 45-year-old male here today for an annual.  I have not seen him in almost a year.  He is due for labs.  Would also like renewals of fluoxetine and amlodipine.  CT calcium score in the past was at 0 so he is no longer on Lipitor.  He does take a vitamin D supplement.  Blood pressure today is well-controlled.  Requesting renewal of amlodipine.  Anxiety is stable.  He is due for colon cancer screening.  Prefers Cologuard.  Still with sleep apnea using CPAP nightly.  Currently is out of network with his insurance but, August insurance will return to [place].

## 2025-06-30 NOTE — PROGRESS NOTES
"Subjective:   João Luque is a 45 y.o. male here today for annual.    Annual physical exam  This is a pleasant 45-year-old male here today for an annual.  I have not seen him in almost a year.  He is due for labs.  Would also like renewals of fluoxetine and amlodipine.  CT calcium score in the past was at 0 so he is no longer on Lipitor.  He does take a vitamin D supplement.  Blood pressure today is well-controlled.  Requesting renewal of amlodipine.  Anxiety is stable.  He is due for colon cancer screening.  Prefers Cologuard.  Still with sleep apnea using CPAP nightly.  Currently is out of network with his insurance but, August insurance will return to [place].       Current medicines (including changes today)  Current Medications[1]  He  has a past medical history of Obesity (1/29/2013).    Social History and Family History were reviewed and updated.    ROS   No chest pain, no shortness of breath, no abdominal pain and all other systems were reviewed and are negative.       Objective:     /74 (BP Location: Right arm, Patient Position: Sitting, BP Cuff Size: Adult)   Pulse 74   Temp 36.8 °C (98.3 °F) (Temporal)   Ht 1.9 m (6' 2.8\")   Wt (!) 137 kg (302 lb 3.2 oz)   SpO2 96%  Body mass index is 37.97 kg/m².   Physical Exam:  Constitutional: Alert, no distress.  Skin: Warm, dry, good turgor, no rashes in visible areas.  Eye: Equal, round and reactive, conjunctiva clear, lids normal.  ENMT: Lips without lesions, good dentition, oropharynx clear.  Neck: Trachea midline, no masses.   Psych: Alert and oriented x3, normal affect and mood.        Assessment and Plan:   The following treatment plan was discussed    1. Annual physical exam (Primary)  Ordered labs.  Fast 8 hours.  He will be contacted with the results.  - VITAMIN D,25 HYDROXY (DEFICIENCY); Future  - CBC WITHOUT DIFFERENTIAL; Future  - Comp Metabolic Panel; Future  - Lipid Profile; Future  - HEMOGLOBIN A1C; Future  - TSH WITH REFLEX TO FT4; " Future    2. Screening for colorectal cancer  Discussed Cologuard in depth.  Ordered.  - Cologuard® colon cancer screening    3. Anxiety  Chronic condition.  Stable.  Renew fluoxetine.  - FLUoxetine (PROZAC) 10 MG Cap; Take 1 Capsule by mouth every day. Needs appt for refills.  Dispense: 90 Capsule; Refill: 3    4. Essential hypertension  Chronic condition.  Stable.  Renewed amlodipine.  - amLODIPine (NORVASC) 10 MG Tab; Take 1 Tablet by mouth every day.  Dispense: 90 Tablet; Refill: 3    5. Mixed hyperlipidemia  Chronic condition.  Check cholesterol level.  Fast 8 hours.  Discontinued Lipitor.    6. Encounter for screening prostate specific antigen (PSA) measurement  PSA ordered.  Screening.  - PROSTATE SPECIFIC AG SCREENING; Future    7. Hypovitaminosis D  Chronic condition.  Continue vitamin D3 supplement.  Ordered vitamin D level.  - VITAMIN D,25 HYDROXY (DEFICIENCY); Future         Followup: Return in about 6 months (around 12/30/2025), or if symptoms worsen or fail to improve.    Please note that this dictation was created using voice recognition software. I have made every reasonable attempt to correct obvious errors, but I expect that there are errors of grammar and possibly content that I did not discover before finalizing the note.                  [1]   Current Outpatient Medications   Medication Sig Dispense Refill    FLUoxetine (PROZAC) 10 MG Cap Take 1 Capsule by mouth every day. Needs appt for refills. 90 Capsule 3    amLODIPine (NORVASC) 10 MG Tab Take 1 Tablet by mouth every day. 90 Tablet 3    atorvastatin (LIPITOR) 20 MG Tab TAKE ONE TABLET BY MOUTH EVERY DAY 90 Tablet 0    Cholecalciferol (VITAMIN D3) 1000 units Cap Take 5,000 Units by mouth every day.       No current facility-administered medications for this visit.

## 2025-07-29 LAB — NONINV COLON CA DNA+OCC BLD SCRN STL QL: NEGATIVE
